# Patient Record
(demographics unavailable — no encounter records)

---

## 2024-11-20 NOTE — HISTORY OF PRESENT ILLNESS
[FreeTextEntry1] : 49 yo perimenopausal female with AUB and h/o endometrial polyp presents for SHG for 1.3cm lesion noted on sonogram suspicious for polyp.

## 2024-11-20 NOTE — PLAN
[FreeTextEntry1] :       sonohysterogram conducted for AUB   UCG negative   Findings: 1.4cm endometrial polyp  Due to DUB I recommend Operative Hysteroscopy Removal of Polyp and D&C   Rx for Doxy sent due to extensive uterine manipulation to dilate cervix  Dr Ruelas present for procedure   RTO for Pre-Op consult with Dr Ruelas

## 2024-11-20 NOTE — PROCEDURE
[Abnormal Uterine Bleeding] : abnormal uterine bleeding [Saline Infusion Sonography] : saline infusion sonography [FreeTextEntry3] :     11/20/2024. SONDRA YOST 50 year year old female presents for a hysterogram due to AUB  Discussed risk of bleeding or infection  Under sterile technique  A speculum was placed in the vagina and the Cervix was identified and prepped with Betadine  Cervical dilator used to enter uterus multiple times. SHG catheter was threaded through the external os until metrial location was felt  The balloon was then inflated with .5 cc of saline  The transvaginal probe was then inserted into the vagina by the tech  The catheter and balloon was localized  Saline was gently instilled while the imaging was performed  The metrial cavity was fully visualized  Findings: 1.4cm fundal polyp  Plan: Operative Hysteroscopy Removal of Polyp and D&C  Pt tolerated procedure well and was given instructions prior to discharge

## 2024-12-13 NOTE — HISTORY OF PRESENT ILLNESS
[FreeTextEntry1] : Pre-Op Consult: Operative Hysteroscopic Endometrial Polyp Removal  Pt. with known endometrial polyp for hysteroscopic polypectomy and D&C Surgery described in detail. Procedure to be performed at [facility]. Discussed risks of procedure to include, but not limited to: - blood loss and possible need for transfusion - infection - perforation with possible injury to viscus or blood vessel that could even require immediate surgical intervention for correction. - fluid issues and deficit. - inability to dilate or creation of false passage -anesthesia - inability to remove or fully remove the polyp - recurrence of polyps - potential malignancy and need for further surgery Pt can take Motrin, Tylenol or Advil for pelvic cramping  D/w pt normal vaginal bleeding on and off for 1-2 week. Pt to call me if she has heavy vaginal bleeding, severe pain or fever Pt can return to work the day after the procedure Nothing per vagina, no strenuous exercise and no tub bathing for 2 weeks after the procedure  Pt given Cytotec to take the night before procedure (400mcg - 2 tabs of 200mcg given to pt) I will review pathology results with pt in 7-10 days and sched f/u in 2 weeks  Plan is for Operative Hysteroscopic Polypectomy and D&C.  25 min > 50% consultation.

## 2025-01-06 NOTE — PLAN
[FreeTextEntry1] : 50 year old SONDRA YOST pt presents for post op visit for polypectomy.   Reviewed surgical imaging with pt- benign polyps Pt cleared for all post-surgical activities.  Rx given for mammo/sono.   RTO PRN or for annual visit

## 2025-01-06 NOTE — HISTORY OF PRESENT ILLNESS
[FreeTextEntry1] : 50 year old SONDRA PYUN pt presents for post op of polypectomy. S/p 12/20/2024 polypectomy.  She complains of moderate bleeding for 1 week post-surgery.    She reports normal urination, no dysuria or incontinence of urine. BM is normal per patient, no blood in stool or constipation/diarrhea. She denies abdominal and pelvic pain.

## 2025-03-03 NOTE — PHYSICAL EXAM
[Chaperone Present] : A chaperone was present in the examining room during all aspects of the physical examination [Appropriately responsive] : appropriately responsive [Alert] : alert [No Acute Distress] : no acute distress [No Lymphadenopathy] : no lymphadenopathy [Regular Rate Rhythm] : regular rate rhythm [No Murmurs] : no murmurs [Clear to Auscultation B/L] : clear to auscultation bilaterally [Soft] : soft [Non-tender] : non-tender [Non-distended] : non-distended [No HSM] : No HSM [No Lesions] : no lesions [No Mass] : no mass [Oriented x3] : oriented x3 [Examination Of The Breasts] : a normal appearance [No Masses] : no breast masses were palpable [Labia Majora] : normal [Labia Minora] : normal [Discharge] :  ~M urethral discharge [Normal] : normal [Uterine Adnexae] : normal [FreeTextEntry2] : Trae Mendoza [FreeTextEntry3] : scant discharge [FreeTextEntry9] : Guaiac test negative, no masses noted

## 2025-03-03 NOTE — PLAN
[FreeTextEntry1] : 50 year old female pt presents for routine gyn exam: Breast and pelvic exam performed Pap/HPV conducted Rx given for mammogram and breast sonogram Pt to schedule pelvic sono and bone density Colonoscopy UTD- due 2028  Excessive Discharge:  Minimal discharge seen on exam. Reassured pt that discharge should lessen with menopause.   RTO PRN   This note was written by Trae Mendoza on 03/03/2025 actively solely Dr. Suraj RASCON.   All medical record entries made by the Scribe were at my, Dr. Suraj WEST direction and personally dictated by me on 03/03/2025. I have personally reviewed the chart and agree that the record reflects my personal performance of the history, physical exam, assessment, and plan.

## 2025-03-03 NOTE — HISTORY OF PRESENT ILLNESS
[Patient reported mammogram was normal] : Patient reported mammogram was normal [Patient reported breast sonogram was normal] : Patient reported breast sonogram was normal [Patient reported colonoscopy was normal] : Patient reported colonoscopy was normal [FreeTextEntry1] : 2025. SONDRA YOST 50 year old female  LMP 2024 presents for annual visit.   She complains of excessive vaginal discharge. Denies malodorous or colored discharge. She reports no vaginal bleeding after D&C. She reports minor hot flashes. She denies  abn discharge or vaginitis sxs. No urinary complaints. She has normal BM, no bloody stool. She denies abdominal or pelvic pain.  PMHx: Psoriasis GynHx: endometrial polyp OBHx:  SHx: Hysteroscopy D&C for endometrial polyp 2023, endometrial polypectomy 2024 FMHx: MGM uterine ca, passed away @74 yo All: NKDA Med: Denies PHQ9=0 [Mammogramdate] : 2023 [TextBox_19] : Pt to send results [BreastSonogramDate] : 2023 [TextBox_25] : Pt to send results [PapSmeardate] : 2023 with diff provider [ColonoscopyDate] : 6/2023 [TextBox_43] : Due 2028